# Patient Record
Sex: FEMALE | Employment: UNEMPLOYED | URBAN - METROPOLITAN AREA
[De-identification: names, ages, dates, MRNs, and addresses within clinical notes are randomized per-mention and may not be internally consistent; named-entity substitution may affect disease eponyms.]

---

## 2017-11-13 ENCOUNTER — HOSPITAL ENCOUNTER (EMERGENCY)
Facility: CLINIC | Age: 27
Discharge: HOME | End: 2017-11-13
Attending: PHYSICIAN ASSISTANT | Admitting: PHYSICIAN ASSISTANT

## 2017-11-13 VITALS
HEART RATE: 74 BPM | WEIGHT: 145 LBS | TEMPERATURE: 98.5 F | BODY MASS INDEX: 23.4 KG/M2 | RESPIRATION RATE: 14 BRPM | OXYGEN SATURATION: 100 %

## 2017-11-13 DIAGNOSIS — N30.01 ACUTE CYSTITIS WITH HEMATURIA: ICD-10-CM

## 2017-11-13 LAB
ALBUMIN UR-MCNC: 100 MG/DL
APPEARANCE UR: ABNORMAL
BACTERIA #/AREA URNS HPF: ABNORMAL /HPF
BILIRUB UR QL STRIP: NEGATIVE
COLOR UR AUTO: YELLOW
GLUCOSE UR STRIP-MCNC: NEGATIVE MG/DL
HCG UR QL: NEGATIVE
HGB UR QL STRIP: ABNORMAL
KETONES UR STRIP-MCNC: NEGATIVE MG/DL
LEUKOCYTE ESTERASE UR QL STRIP: ABNORMAL
NITRATE UR QL: POSITIVE
PH UR STRIP: 6.5 PH (ref 5–7)
RBC #/AREA URNS AUTO: 21 /HPF (ref 0–2)
SOURCE: ABNORMAL
SP GR UR STRIP: 1.02 (ref 1–1.03)
UROBILINOGEN UR STRIP-MCNC: NORMAL MG/DL (ref 0–2)
WBC #/AREA URNS AUTO: <1 /HPF (ref 0–2)

## 2017-11-13 PROCEDURE — 99213 OFFICE O/P EST LOW 20 MIN: CPT

## 2017-11-13 PROCEDURE — 87088 URINE BACTERIA CULTURE: CPT | Performed by: NURSE PRACTITIONER

## 2017-11-13 PROCEDURE — 87086 URINE CULTURE/COLONY COUNT: CPT | Performed by: NURSE PRACTITIONER

## 2017-11-13 PROCEDURE — 81025 URINE PREGNANCY TEST: CPT | Performed by: NURSE PRACTITIONER

## 2017-11-13 PROCEDURE — 87186 SC STD MICRODIL/AGAR DIL: CPT | Performed by: NURSE PRACTITIONER

## 2017-11-13 PROCEDURE — 81001 URINALYSIS AUTO W/SCOPE: CPT | Performed by: NURSE PRACTITIONER

## 2017-11-13 PROCEDURE — 99213 OFFICE O/P EST LOW 20 MIN: CPT | Performed by: PHYSICIAN ASSISTANT

## 2017-11-13 RX ORDER — NITROFURANTOIN 25; 75 MG/1; MG/1
100 CAPSULE ORAL 2 TIMES DAILY
Qty: 14 CAPSULE | Refills: 0 | Status: SHIPPED | OUTPATIENT
Start: 2017-11-13 | End: 2017-11-20

## 2017-11-13 NOTE — ED AVS SNAPSHOT
[facility] Emergency Department    [address] [address]    Phone:  [phone]    Fax:  [phone]                                       Betsey Kee   MRN: [id number]    Department:  [facility] Emergency Department   Date of Visit:  11/13/2017           After Visit Summary Signature Page     I have received my discharge instructions, and my questions have been answered. I have discussed any challenges I see with this plan with the nurse or doctor.    ..........................................................................................................................................  Patient/Patient Representative Signature      ..........................................................................................................................................  Patient Representative Print Name and Relationship to Patient    ..................................................               ................................................  Date                                            Time    ..........................................................................................................................................  Reviewed by Signature/Title    ...................................................              ..............................................  Date                                                            Time

## 2017-11-13 NOTE — ED AVS SNAPSHOT
[facility] Emergency Department    [address] [place] [address]    Phone:  [phone]    Fax:  [phone]                                       Betsey Kee   MRN: [id number]    Department:  [facility] Emergency Department   Date of Visit:  11/13/2017           Patient Information     Date Of Birth          1990        Your diagnoses for this visit were:     Acute cystitis with hematuria        You were seen by Leonela Piper PA-C.      Follow-up Information     Follow up with Bernabe Nance MD In 1 week.    Specialty:  Family Practice    Why:  As needed, If symptoms worsen    Contact information:    [address]   [place] [address]  [phone]          Discharge Instructions       Use Medication as directed    Patient advised to call for any lab results (if obtained during visit) within 2-3 days.   Drink plenty of fluids.     Prevention and treatment of UTI's discussed.  Signs and symptoms of pyelonephritis mentioned.    Patient to return to clinic sooner if not improving as expected.   Go to ER if any worsening symptoms or signs/symptoms of phylonephritis occur or flank pain occurs.           24 Hour Appointment Hotline       To make an appointment at any [facility], call [phone]). If you don't have a family doctor or clinic, we will help you find one. [facility] clinics are conveniently located to serve the needs of you and your family.             Review of your medicines      START taking        Dose / Directions Last dose taken    nitroFURantoin (macrocrystal-monohydrate) 100 MG capsule   Commonly known as:  MACROBID   Dose:  100 mg   Quantity:  14 capsule        Take 1 capsule (100 mg) by mouth 2 times daily for 7 days   Refills:  0          Our records show that you are taking the medicines listed below. If these are incorrect, please call your family doctor or clinic.        Dose / Directions Last dose taken    ibuprofen 200 MG  tablet   Commonly known as:  ADVIL/MOTRIN   Dose:  400-800 mg   Quantity:  120 tablet        Take 2-4 tablets (400-800 mg) by mouth every 6 hours as needed (cramping)   Refills:  0                Prescriptions were sent or printed at these locations (1 Prescription)                   [facility] - [place], [place] - [address] [place] [address]   [address], [place] [address]    Telephone:  [phone]   Fax:  [phone]   Hours:                  E-Prescribed (1 of 1)         nitroFURantoin, macrocrystal-monohydrate, (MACROBID) 100 MG capsule                Procedures and tests performed during your visit     HCG qualitative urine    UA with Microscopic    Urine Culture      Orders Needing Specimen Collection     None      Pending Results     Date and Time Order Name Status Description    11/13/2017 1825 Urine Culture In process             Pending Culture Results     Date and Time Order Name Status Description    11/13/2017 1825 Urine Culture In process             Pending Results Instructions     If you had any lab results that were not finalized at the time of your Discharge, you can call the ED Lab Result RN at [phone]. You will be contacted by this team for any positive Lab results or changes in treatment. The nurses are available 7 days a week from 10A to 6:30P.  You can leave a message 24 hours per day and they will return your call.        Test Results From Your Hospital Stay        11/13/2017  6:44 PM         11/13/2017  7:16 PM      Component Results     Component Value Ref Range & Units Status    Color Urine Yellow  Final    Appearance Urine Slightly Cloudy  Final    Glucose Urine Negative NEG^Negative mg/dL Final    Bilirubin Urine Negative NEG^Negative Final    Ketones Urine Negative NEG^Negative mg/dL Final    Specific Gravity Urine 1.020 1.003 - 1.035 Final    Blood Urine Moderate (A) NEG^Negative Final    pH Urine 6.5 5.0 - 7.0 pH Final    Protein  "Albumin Urine 100 (A) NEG^Negative mg/dL Final    Urobilinogen mg/dL Normal 0.0 - 2.0 mg/dL Final    Nitrite Urine Positive (A) NEG^Negative Final    Leukocyte Esterase Urine Small (A) NEG^Negative Final    Source Midstream Urine  Final    WBC Urine <1 0 - 2 /HPF Final    RBC Urine 21 (H) 0 - 2 /HPF Final    Bacteria Urine Few (A) NEG^Negative /HPF Final         2017  7:14 PM      Component Results     Component Value Ref Range & Units Status    HCG Qual Urine Negative NEG^Negative Final    This test is for screening purposes.  Results should be interpreted along with   the clinical picture.  Confirmation testing is available if warranted by   ordering KDM486, HCG Quantitative Pregnancy.                  Thank you for choosing [facility]       Thank you for choosing [facility] for your care. Our goal is always to provide you with excellent care. Hearing back from our patients is one way we can continue to improve our services. Please take a few minutes to complete the written survey that you may receive in the mail after you visit with us. Thank you!        "[employer]" Information     "[employer]" lets you send messages to your doctor, view your test results, renew your prescriptions, schedule appointments and more. To sign up, go to [email or web address]" . Click on \"Log in\" on the left side of the screen, which will take you to the Welcome page. Then click on \"Sign up Now\" on the right side of the page.     You will be asked to enter the access code listed below, as well as some personal information. Please follow the directions to create your username and password.     Your access code is: [email or web address]  Expires: 2018  7:27 PM     Your access code will  in 90 days. If you need help or a new code, please call your [facility] clinic or [phone].        Care EveryWhere ID     This is your Care EveryWhere ID. This could be used by other organizations to access your [facility] medical records  [id number]      "   Equal Access to Services     JOYCELYN TRISTAN : Quita Palencia, allison cosby, clayton workman. So [facility] [phone].    ATENCIÓN: Si habla español, tiene a head disposición servicios gratuitos de asistencia lingüística. Llame al [phone].    We comply with applicable federal civil rights laws and [place] laws. We do not discriminate on the basis of race, color, national origin, age, disability, sex, sexual orientation, or gender identity.            After Visit Summary       This is your record. Keep this with you and show to your community pharmacist(s) and doctor(s) at your next visit.

## 2017-11-14 NOTE — ED PROVIDER NOTES
History     Chief Complaint   Patient presents with     Hematuria     blood in urine tonight. Burning with urination as well this evening.      HPI    Betsey Kee is a 27 year old female who  presents today with urinary symptoms. Symptoms of dysuria and frequency have been going on for 1day(s).  Hematuria yes this evening.  sudden onsetand mild.  This patient does not have a history of urinary tract infections.   Vaginal symptoms positive discharge and slight irritation; offered wet prep and pelvic in office today patient declined. Patient states yesterday she had some left flank pain, but that has gone away.     Any history of STDs no    Patient sexually active in monogamous relationship.   Patient finished menstrual period 2 days ago.     Problem list, Medication list, Allergies, and Medical/Social/Surgical histories reviewed in [facility] and updated as appropriate.    Problem List:    Patient Active Problem List    Diagnosis Date Noted     Anemia due to blood loss, acute 09/05/2013     Priority: Medium     Carrier of group B Streptococcus 08/07/2013     Priority: Medium     Supervision of normal first pregnancy 05/31/2013     Priority: Medium     Transfer  Normal anatomy US-see records.       24 hr info given 04/04/2012     Priority: Medium     EMERGENCY CARE PLAN  Presenting Problem Signs and Symptoms Treatment Plan    Questions or conerns during clinic hours    I will call the clinic directly     Questions or conerns outside clinic hours    I will call the 24 hour nurse line at [phone]    Patient needs to schedule an appointment    I will call the 24 hour scheduling team at [phone] or clinic directly    Same day treatment     I will call the clinic first, nurse line if after hours, urgent care and express care if needed                                   CARDIOVASCULAR SCREENING; LDL GOAL LESS THAN 160 08/09/2011     Priority: Medium        Past Medical History:    Past Medical History:   Diagnosis  "Date     Chickenpox        Past Surgical History:    Past Surgical History:   Procedure Laterality Date      SECTION  2013    Procedure:  SECTION;   Section;  Surgeon: Maty Soto MD;  Location: [facility] OR     NO HISTORY OF SURGERY         Family History:    Family History   Problem Relation Age of Onset     Psychotic Disorder Father      depression     Alcohol/Drug Maternal Uncle      Several uncles     DIABETES Maternal Grandfather      Thyroid Disease Mother      Hypertension Mother      DIABETES Mother      \"borderline\"     Hypertension Maternal Grandmother      Arthritis Maternal Grandmother      Hypertension Maternal Grandfather      Depression Maternal Grandfather      Family History Negative Paternal Grandmother      Connective Tissue Disorder Paternal Grandfather      skin disease-unknown what it is     Alcohol/Drug Father      alcohol     Alcohol/Drug Maternal Grandfather      alcohol     Alcohol/Drug Paternal Grandfather      alcohol     Blood Disease Maternal Grandmother      anemia     CANCER Paternal Grandfather      CANCER Maternal Grandfather      pancreatic     Cardiovascular Maternal Grandfather      MI       Social History:  Marital Status:  Single [1]  Social History   Substance Use Topics     Smoking status: Never Smoker     Smokeless tobacco: Never Used      Comment: no exposure     Alcohol use No        Medications:      nitroFURantoin, macrocrystal-monohydrate, (MACROBID) 100 MG capsule   ibuprofen (ADVIL,MOTRIN) 200 MG tablet         Review of Systems    Physical Exam   Pulse: 74  Temp: 98.5  F (36.9  C)  Resp: 14  Weight: 65.8 kg (145 lb)  SpO2: 100 %      Physical Exam     Pulse 74  Temp 98.5  F (36.9  C) (Oral)  Resp 14  Wt 65.8 kg (145 lb)  LMP 2017 (Exact Date)  SpO2 100%  BMI 23.4 kg/m2    GENERAL APPEARANCE: healthy, alert and no distress  RESP: lungs clear to auscultation - no rales, rhonchi or wheezes  CV: regular rates and rhythm, normal S1 " S2, no murmur noted  ABDOMEN:  soft, nontender, no HSM or masses and bowel sounds normal  BACK: No CVA tenderness  SKIN: no suspicious lesions or rashes    Results for orders placed or performed during the hospital encounter of 11/13/17 (from the past 24 hour(s))   HCG qualitative urine   Result Value Ref Range    HCG Qual Urine Negative NEG^Negative         ED Course     ED Course     Procedures              Critical Care time:  none               Labs Ordered and Resulted from Time of ED Arrival Up to the Time of Departure from the ED   ROUTINE UA WITH MICROSCOPIC - Abnormal; Notable for the following:        Result Value    Blood Urine Moderate (*)     Protein Albumin Urine 100 (*)     Nitrite Urine Positive (*)     Leukocyte Esterase Urine Small (*)     RBC Urine 21 (*)     Bacteria Urine Few (*)     All other components within normal limits   HCG QUALITATIVE URINE   URINE CULTURE AEROBIC BACTERIAL       Assessments & Plan (with Medical Decision Making)     I have reviewed the nursing notes.    I have reviewed the findings, diagnosis, plan and need for follow up with the patient.       New Prescriptions    NITROFURANTOIN, MACROCRYSTAL-MONOHYDRATE, (MACROBID) 100 MG CAPSULE    Take 1 capsule (100 mg) by mouth 2 times daily for 7 days       Final diagnoses:   Acute cystitis with hematuria       11/13/2017   [facility] EMERGENCY DEPARTMENT     Leonela Piper PA-C  11/13/17 1929

## 2017-11-14 NOTE — DISCHARGE INSTRUCTIONS
Use Medication as directed    Patient advised to call for any lab results (if obtained during visit) within 2-3 days.   Drink plenty of fluids.     Prevention and treatment of UTI's discussed.  Signs and symptoms of pyelonephritis mentioned.    Patient to return to clinic sooner if not improving as expected.   Go to ER if any worsening symptoms or signs/symptoms of phylonephritis occur or flank pain occurs.

## 2017-11-15 ENCOUNTER — TELEPHONE (OUTPATIENT)
Dept: EMERGENCY MEDICINE | Facility: CLINIC | Age: 27
End: 2017-11-15

## 2017-11-15 LAB
BACTERIA SPEC CULT: ABNORMAL
Lab: ABNORMAL
SPECIMEN SOURCE: ABNORMAL

## 2017-11-15 NOTE — TELEPHONE ENCOUNTER
[facility] Emergency Department Lab result notification:    Reason for call  Notify of lab results, assess symptoms,  review ED providers recommendations (if necessary) and advise per ED lab result f/u protocol.    Lab result  Final Urine Culture Report on 11/15/17  [facility] ED discharge antibiotic: Nitrofurantoin Macrocrystal-Monohydrate (Macrobid) 100 mg PO capsule, Take 1 capsule (100 mg) by mouth 2 times daily for 7 days  #1. Bacteria, >100,000 colonies/mL Escherichia coli, is SUSCEPTIBLE to ED discharge antibiotic.    As per [facility] ED Lab Result protocol, no change in antibiotic therapy.  Left voicemail message requesting a call back to [phone] between 10 a.m. and 6:30 p.m. for patient's ED/ lab results.    Lisa Duran RN    [facility] RN  Lung Nodule and ED Lab Results F/U RN  Epic pool (ED late result f/u RN) : [id number]   # [phone]

## 2019-11-06 ENCOUNTER — HOSPITAL ENCOUNTER (EMERGENCY)
Facility: CLINIC | Age: 29
Discharge: HOME | End: 2019-11-06
Attending: PHYSICIAN ASSISTANT | Admitting: PHYSICIAN ASSISTANT
Payer: COMMERCIAL

## 2019-11-06 VITALS
HEART RATE: 60 BPM | OXYGEN SATURATION: 100 % | SYSTOLIC BLOOD PRESSURE: 131 MMHG | DIASTOLIC BLOOD PRESSURE: 89 MMHG | BODY MASS INDEX: 26.52 KG/M2 | RESPIRATION RATE: 16 BRPM | HEIGHT: 66 IN | WEIGHT: 165 LBS | TEMPERATURE: 98.1 F

## 2019-11-06 DIAGNOSIS — B02.9 SHINGLES: ICD-10-CM

## 2019-11-06 PROCEDURE — G0463 HOSPITAL OUTPT CLINIC VISIT: HCPCS

## 2019-11-06 PROCEDURE — 99213 OFFICE O/P EST LOW 20 MIN: CPT | Mod: Z6 | Performed by: PHYSICIAN ASSISTANT

## 2019-11-06 RX ORDER — VALACYCLOVIR HYDROCHLORIDE 1 G/1
1000 TABLET, FILM COATED ORAL 3 TIMES DAILY
Qty: 21 TABLET | Refills: 0 | Status: SHIPPED | OUTPATIENT
Start: 2019-11-06 | End: 2023-11-15

## 2019-11-06 ASSESSMENT — ENCOUNTER SYMPTOMS
GASTROINTESTINAL NEGATIVE: 1
CARDIOVASCULAR NEGATIVE: 1
CONSTITUTIONAL NEGATIVE: 1

## 2019-11-06 ASSESSMENT — MIFFLIN-ST. JEOR: SCORE: 1495.19

## 2019-11-06 NOTE — ED AVS SNAPSHOT
[facility] Emergency Department  [address] [address]  Phone:  [phone]  Fax:  [phone]                                    Betsey Kee   MRN: [id number]    Department:  [facility] Emergency Department   Date of Visit:  11/6/2019           After Visit Summary Signature Page    I have received my discharge instructions, and my questions have been answered. I have discussed any challenges I see with this plan with the nurse or doctor.    ..........................................................................................................................................  Patient/Patient Representative Signature      ..........................................................................................................................................  Patient Representative Print Name and Relationship to Patient    ..................................................               ................................................  Date                                   Time    ..........................................................................................................................................  Reviewed by Signature/Title    ...................................................              ..............................................  Date                                               Time          22EPIC Rev 08/18

## 2019-11-07 NOTE — ED PROVIDER NOTES
"  History     Chief Complaint   Patient presents with     Rash     painful rash started 3 days ago on her back     HPI  Betsey Kee is a 29 year old female who presents to  for evaluation of rash. About 3 days ago she noticed some bumps on her back, left side. She has noticed a new patch of bumps near her right shoulder blade and a few bumps on her right forearm. She tells me they were initially irritating, she now describes a deep, sharper pain. She feels like a \"rug burn\". She denies any drainage or bleeding. She denies any fevers, sweats, chills. She has been run down with a cold, and also has a new 2 month old at home. She had chicken pox as a child. Her 2 older children have both been immunized.       Allergies:  Allergies   Allergen Reactions     Nkda [No Known Drug Allergies]        Problem List:    Patient Active Problem List    Diagnosis Date Noted     Anemia due to blood loss, acute 09/05/2013     Priority: Medium     Carrier of group B Streptococcus 08/07/2013     Priority: Medium     Supervision of normal first pregnancy 05/31/2013     Priority: Medium     Transfer  Normal anatomy US-see records.       24 hr info given 04/04/2012     Priority: Medium     EMERGENCY CARE PLAN  Presenting Problem Signs and Symptoms Treatment Plan    Questions or conerns during clinic hours    I will call the clinic directly     Questions or conerns outside clinic hours    I will call the 24 hour nurse line at [phone]    Patient needs to schedule an appointment    I will call the 24 hour scheduling team at [phone] or clinic directly    Same day treatment     I will call the clinic first, nurse line if after hours, urgent care and express care if needed                                   CARDIOVASCULAR SCREENING; LDL GOAL LESS THAN 160 08/09/2011     Priority: Medium        Past Medical History:    Past Medical History:   Diagnosis Date     Chickenpox        Past Surgical History:    Past Surgical History: " "  Procedure Laterality Date      SECTION  2013    Procedure:  SECTION;   Section;  Surgeon: Maty Soto MD;  Location: [facility] OR     NO HISTORY OF SURGERY         Family History:    Family History   Problem Relation Age of Onset     Psychotic Disorder Father         depression     Alcohol/Drug Maternal Uncle         Several uncles     Diabetes Maternal Grandfather      Thyroid Disease Mother      Hypertension Mother      Diabetes Mother         \"borderline\"     Hypertension Maternal Grandmother      Arthritis Maternal Grandmother      Hypertension Maternal Grandfather      Depression Maternal Grandfather      Family History Negative Paternal Grandmother      Connective Tissue Disorder Paternal Grandfather         skin disease-unknown what it is     Alcohol/Drug Father         alcohol     Alcohol/Drug Maternal Grandfather         alcohol     Alcohol/Drug Paternal Grandfather         alcohol     Blood Disease Maternal Grandmother         anemia     Cancer Paternal Grandfather      Cancer Maternal Grandfather         pancreatic     Cardiovascular Maternal Grandfather         MI       Social History:  Marital Status:  Single [1]  Social History     Tobacco Use     Smoking status: Never Smoker     Smokeless tobacco: Never Used     Tobacco comment: no exposure   Substance Use Topics     Alcohol use: No     Drug use: No        Medications:    ibuprofen (ADVIL,MOTRIN) 200 MG tablet          Review of Systems   Constitutional: Negative.    Cardiovascular: Negative.    Gastrointestinal: Negative.    Skin: Positive for rash.       Physical Exam   BP: 131/89  Pulse: 60  Temp: 98.1  F (36.7  C)  Resp: 16  Height: 167.6 cm (5' 6\")  Weight: 74.8 kg (165 lb)(stated)  SpO2: 100 %      Physical Exam  Constitutional:       Appearance: Normal appearance. She is normal weight.   Skin:         Neurological:      Mental Status: She is alert.         ED Course        Procedures               Critical Care " time:  none               No results found for this or any previous visit (from the past 24 hour(s)).    Medications - No data to display    Assessments & Plan (with Medical Decision Making)   Rash is consistent with shingles. We discussed course of illness at length. Given the fact she has a 2 month old at home, who could potentially develop varicella as she has not been immunized, she should avoid contact to the rash. Pt and her mother express understanding. She will cover the lesions with gauze for pain relief as well. Valtrex as prescribed. She can take ibuprofen and APAP for relief. Follow-up with PCP if symptoms are not improving or with worsening pain.     I have reviewed the nursing notes.    I have reviewed the findings, diagnosis, plan and need for follow up with the patient.       New Prescriptions    No medications on file       Final diagnoses:   None       11/6/2019   [facility] EMERGENCY DEPARTMENT     Maty Castañeda PA-C  11/06/19 0126

## 2019-11-07 NOTE — DISCHARGE INSTRUCTIONS
You have shingles  Start valtrex now  Wash clothing separately from baby  Do not expose baby to the rash  Ibuprofen and tylenol for pain  Rash should resolve in 10-12 days

## 2021-11-27 ENCOUNTER — HOSPITAL ENCOUNTER (EMERGENCY)
Facility: CLINIC | Age: 31
Discharge: HOME | End: 2021-11-27
Attending: PHYSICIAN ASSISTANT | Admitting: PHYSICIAN ASSISTANT
Payer: COMMERCIAL

## 2021-11-27 VITALS
HEIGHT: 66 IN | DIASTOLIC BLOOD PRESSURE: 73 MMHG | BODY MASS INDEX: 26.52 KG/M2 | OXYGEN SATURATION: 100 % | TEMPERATURE: 99.3 F | HEART RATE: 88 BPM | SYSTOLIC BLOOD PRESSURE: 129 MMHG | WEIGHT: 165 LBS

## 2021-11-27 DIAGNOSIS — Z20.822 EXPOSURE TO 2019 NOVEL CORONAVIRUS: ICD-10-CM

## 2021-11-27 DIAGNOSIS — Z20.822 SUSPECTED COVID-19 VIRUS INFECTION: ICD-10-CM

## 2021-11-27 LAB
FLUAV RNA SPEC QL NAA+PROBE: NEGATIVE
FLUBV RNA RESP QL NAA+PROBE: NEGATIVE
SARS-COV-2 RNA RESP QL NAA+PROBE: POSITIVE

## 2021-11-27 PROCEDURE — 99282 EMERGENCY DEPT VISIT SF MDM: CPT | Performed by: PHYSICIAN ASSISTANT

## 2021-11-27 PROCEDURE — 87636 SARSCOV2 & INF A&B AMP PRB: CPT | Performed by: PHYSICIAN ASSISTANT

## 2021-11-27 PROCEDURE — 99283 EMERGENCY DEPT VISIT LOW MDM: CPT

## 2021-11-27 PROCEDURE — C9803 HOPD COVID-19 SPEC COLLECT: HCPCS

## 2021-11-27 ASSESSMENT — ENCOUNTER SYMPTOMS
FATIGUE: 1
CARDIOVASCULAR NEGATIVE: 1
FEVER: 1
COUGH: 1
SHORTNESS OF BREATH: 1
ARTHRALGIAS: 1

## 2021-11-27 ASSESSMENT — MIFFLIN-ST. JEOR: SCORE: 1485.19

## 2021-11-28 NOTE — ED PROVIDER NOTES
History     Chief Complaint   Patient presents with     Cough     HPI  Betsey Kee is a 31 year old female who presents for evaluation of fevers up to 102*F, cough, fatigue, and body aches for the past 2-3 days.  Pt's son is ill with similar symptoms.  They just found out that pt's mother just tested positive for COVID-19 and they have been around her with close contact.  Pt also complains of mild associated shortness of breath with exertion.  Denies nausea, vomiting, diarrhea, abdominal pain, or chest pain.  Pt is not vaccinated against COVID-19.  She has no history of asthma.      Allergies:  Allergies   Allergen Reactions     Nkda [No Known Drug Allergies]        Problem List:    Patient Active Problem List    Diagnosis Date Noted     Anemia due to blood loss, acute 2013     Priority: Medium     Carrier of group B Streptococcus 2013     Priority: Medium     Supervision of normal first pregnancy 2013     Priority: Medium     Transfer  Normal anatomy US-see records.       24 hr info given 2012     Priority: Medium     EMERGENCY CARE PLAN  Presenting Problem Signs and Symptoms Treatment Plan    Questions or conerns during clinic hours    I will call the clinic directly     Questions or conerns outside clinic hours    I will call the 24 hour nurse line at [phone]    Patient needs to schedule an appointment    I will call the 24 hour scheduling team at [phone] or clinic directly    Same day treatment     I will call the clinic first, nurse line if after hours, urgent care and express care if needed                                   CARDIOVASCULAR SCREENING; LDL GOAL LESS THAN 160 2011     Priority: Medium        Past Medical History:    Past Medical History:   Diagnosis Date     Chickenpox        Past Surgical History:    Past Surgical History:   Procedure Laterality Date      SECTION  2013    Procedure:  SECTION;   Section;  Surgeon: Charles  "Maty Diaz MD;  Location: [facility] OR     NO HISTORY OF SURGERY         Family History:    Family History   Problem Relation Age of Onset     Psychotic Disorder Father         depression     Alcohol/Drug Maternal Uncle         Several uncles     Diabetes Maternal Grandfather      Thyroid Disease Mother      Hypertension Mother      Diabetes Mother         \"borderline\"     Hypertension Maternal Grandmother      Arthritis Maternal Grandmother      Hypertension Maternal Grandfather      Depression Maternal Grandfather      Family History Negative Paternal Grandmother      Connective Tissue Disorder Paternal Grandfather         skin disease-unknown what it is     Alcohol/Drug Father         alcohol     Alcohol/Drug Maternal Grandfather         alcohol     Alcohol/Drug Paternal Grandfather         alcohol     Blood Disease Maternal Grandmother         anemia     Cancer Paternal Grandfather      Cancer Maternal Grandfather         pancreatic     Cardiovascular Maternal Grandfather         MI       Social History:  Marital Status:  Single [1]  Social History     Tobacco Use     Smoking status: Never Smoker     Smokeless tobacco: Never Used     Tobacco comment: no exposure   Substance Use Topics     Alcohol use: No     Drug use: No        Medications:    ibuprofen (ADVIL,MOTRIN) 200 MG tablet  valACYclovir (VALTREX) 1000 mg tablet          Review of Systems   Constitutional: Positive for fatigue and fever.   Respiratory: Positive for cough and shortness of breath.    Cardiovascular: Negative.    Musculoskeletal: Positive for arthralgias.   Skin: Negative.    All other systems reviewed and are negative.      Physical Exam   BP: 129/73  Pulse: 88  Temp: 99.3  F (37.4  C)  Height: 167.6 cm (5' 6\")  Weight: 74.8 kg (165 lb)  SpO2: 100 %      Physical Exam  Constitutional:       General: She is not in acute distress.     Appearance: Normal appearance. She is well-developed. She is not ill-appearing, toxic-appearing or diaphoretic. "   HENT:      Head: Normocephalic and atraumatic.      Right Ear: Tympanic membrane, ear canal and external ear normal.      Left Ear: Tympanic membrane, ear canal and external ear normal.      Nose: Nose normal. No congestion or rhinorrhea.      Mouth/Throat:      Lips: Pink.      Mouth: Mucous membranes are moist.      Pharynx: Oropharynx is clear. Uvula midline. No pharyngeal swelling, oropharyngeal exudate, posterior oropharyngeal erythema or uvula swelling.      Tonsils: No tonsillar exudate or tonsillar abscesses.   Eyes:      Extraocular Movements: Extraocular movements intact.      Conjunctiva/sclera: Conjunctivae normal.      Pupils: Pupils are equal, round, and reactive to light.   Cardiovascular:      Rate and Rhythm: Normal rate and regular rhythm.      Heart sounds: Normal heart sounds.   Pulmonary:      Effort: Pulmonary effort is normal. No respiratory distress.      Breath sounds: Normal breath sounds. No stridor. No wheezing, rhonchi or rales.   Musculoskeletal:         General: Normal range of motion.      Cervical back: Full passive range of motion without pain, normal range of motion and neck supple. No rigidity. Normal range of motion.   Lymphadenopathy:      Cervical: No cervical adenopathy.   Skin:     General: Skin is warm and dry.   Neurological:      Mental Status: She is alert and oriented to person, place, and time.   Psychiatric:         Behavior: Behavior is cooperative.         ED Course        Procedures    No results found for this or any previous visit (from the past 24 hour(s)).    Medications - No data to display    Assessments & Plan (with Medical Decision Making)     Pt is a 31 year old female who presents for evaluation of fevers up to 102*F, cough, fatigue, and body aches for the past 2-3 days.  Pt's son is ill with similar symptoms.  They just found out that pt's mother just tested positive for COVID-19 and they have been around her with close contact.  Pt also complains of  mild associated shortness of breath with exertion.  Pt is not vaccinated against COVID-19.      Pt is afebrile on arrival.  Exam as above.  COVID-19 testing was obtained and is pending.  Pt is not hypoxic.  She is not in any respiratory distress.  Encouraged symptomatic treatments at home.  Return precautions were reviewed.  Hand-outs were provided.    Patient was instructed to follow-up with PCP in 3-5 days for continued care and management or sooner if new or worsening symptoms.  She is to return to the ED for persistent and/or worsening symptoms.  Patient expressed understanding of the diagnosis and plan and was discharged home in good condition.    I have reviewed the nursing notes.    I have reviewed the findings, diagnosis, plan and need for follow up with the patient.    New Prescriptions    No medications on file       Final diagnoses:   Suspected COVID-19 virus infection   Exposure to 2019 novel coronavirus       11/27/2021   [facility] EMERGENCY DEPT      Disclaimer:  This note consists of symbols derived from keyboarding, dictation and/or voice recognition software.  As a result, there may be errors in the script that have gone undetected.  Please consider this when interpreting information found in this chart.     Courtney Loaiza PA-C  11/27/21 2111

## 2021-12-26 ENCOUNTER — HEALTH MAINTENANCE LETTER (OUTPATIENT)
Age: 31
End: 2021-12-26

## 2022-10-23 ENCOUNTER — HEALTH MAINTENANCE LETTER (OUTPATIENT)
Age: 32
End: 2022-10-23

## 2023-04-02 ENCOUNTER — HEALTH MAINTENANCE LETTER (OUTPATIENT)
Age: 33
End: 2023-04-02

## 2023-10-29 ENCOUNTER — HOSPITAL ENCOUNTER (EMERGENCY)
Facility: CLINIC | Age: 33
Discharge: HOME | End: 2023-10-29
Attending: EMERGENCY MEDICINE | Admitting: EMERGENCY MEDICINE
Payer: COMMERCIAL

## 2023-10-29 VITALS
OXYGEN SATURATION: 100 % | HEIGHT: 66 IN | TEMPERATURE: 98.3 F | RESPIRATION RATE: 15 BRPM | DIASTOLIC BLOOD PRESSURE: 65 MMHG | HEART RATE: 87 BPM | WEIGHT: 160 LBS | BODY MASS INDEX: 25.71 KG/M2 | SYSTOLIC BLOOD PRESSURE: 114 MMHG

## 2023-10-29 DIAGNOSIS — R79.89 ELEVATED TROPONIN: ICD-10-CM

## 2023-10-29 DIAGNOSIS — R00.2 PALPITATIONS: ICD-10-CM

## 2023-10-29 DIAGNOSIS — D50.9 IRON DEFICIENCY ANEMIA, UNSPECIFIED IRON DEFICIENCY ANEMIA TYPE: ICD-10-CM

## 2023-10-29 LAB
ALBUMIN UR-MCNC: NEGATIVE MG/DL
ANION GAP SERPL CALCULATED.3IONS-SCNC: 17 MMOL/L (ref 7–15)
APPEARANCE UR: CLEAR
BASOPHILS # BLD AUTO: 0.1 10E3/UL (ref 0–0.2)
BASOPHILS NFR BLD AUTO: 1 %
BILIRUB UR QL STRIP: NEGATIVE
BUN SERPL-MCNC: 10.1 MG/DL (ref 6–20)
CALCIUM SERPL-MCNC: 9.7 MG/DL (ref 8.6–10)
CHLORIDE SERPL-SCNC: 106 MMOL/L (ref 98–107)
COLOR UR AUTO: COLORLESS
CREAT SERPL-MCNC: 0.79 MG/DL (ref 0.51–0.95)
DEPRECATED HCO3 PLAS-SCNC: 18 MMOL/L (ref 22–29)
EGFRCR SERPLBLD CKD-EPI 2021: >90 ML/MIN/1.73M2
EOSINOPHIL # BLD AUTO: 0.1 10E3/UL (ref 0–0.7)
EOSINOPHIL NFR BLD AUTO: 1 %
ERYTHROCYTE [DISTWIDTH] IN BLOOD BY AUTOMATED COUNT: 20.2 % (ref 10–15)
GLUCOSE SERPL-MCNC: 86 MG/DL (ref 70–99)
GLUCOSE UR STRIP-MCNC: NEGATIVE MG/DL
HCG UR QL: NEGATIVE
HCT VFR BLD AUTO: 34.4 % (ref 35–47)
HGB BLD-MCNC: 9.7 G/DL (ref 11.7–15.7)
HGB UR QL STRIP: ABNORMAL
IMM GRANULOCYTES # BLD: 0 10E3/UL
IMM GRANULOCYTES NFR BLD: 0 %
IRON BINDING CAPACITY (ROCHE): 456 UG/DL (ref 240–430)
IRON SATN MFR SERPL: 4 % (ref 15–46)
IRON SERPL-MCNC: 16 UG/DL (ref 37–145)
KETONES UR STRIP-MCNC: 5 MG/DL
LEUKOCYTE ESTERASE UR QL STRIP: NEGATIVE
LYMPHOCYTES # BLD AUTO: 0.9 10E3/UL (ref 0.8–5.3)
LYMPHOCYTES NFR BLD AUTO: 12 %
MCH RBC QN AUTO: 18.5 PG (ref 26.5–33)
MCHC RBC AUTO-ENTMCNC: 28.2 G/DL (ref 31.5–36.5)
MCV RBC AUTO: 66 FL (ref 78–100)
MONOCYTES # BLD AUTO: 0.3 10E3/UL (ref 0–1.3)
MONOCYTES NFR BLD AUTO: 4 %
MUCOUS THREADS #/AREA URNS LPF: PRESENT /LPF
NEUTROPHILS # BLD AUTO: 6.2 10E3/UL (ref 1.6–8.3)
NEUTROPHILS NFR BLD AUTO: 82 %
NITRATE UR QL: NEGATIVE
NRBC # BLD AUTO: 0 10E3/UL
NRBC BLD AUTO-RTO: 0 /100
PH UR STRIP: 6 [PH] (ref 5–7)
PLATELET # BLD AUTO: 262 10E3/UL (ref 150–450)
POTASSIUM SERPL-SCNC: 3.8 MMOL/L (ref 3.4–5.3)
RBC # BLD AUTO: 5.23 10E6/UL (ref 3.8–5.2)
RBC URINE: 1 /HPF
SODIUM SERPL-SCNC: 141 MMOL/L (ref 135–145)
SP GR UR STRIP: 1 (ref 1–1.03)
SQUAMOUS EPITHELIAL: <1 /HPF
TROPONIN T SERPL HS-MCNC: 42 NG/L
TROPONIN T SERPL HS-MCNC: 46 NG/L
TROPONIN T SERPL HS-MCNC: 58 NG/L
TSH SERPL DL<=0.005 MIU/L-ACNC: 1.25 UIU/ML (ref 0.3–4.2)
UROBILINOGEN UR STRIP-MCNC: NORMAL MG/DL
WBC # BLD AUTO: 7.5 10E3/UL (ref 4–11)
WBC URINE: 2 /HPF

## 2023-10-29 PROCEDURE — 80048 BASIC METABOLIC PNL TOTAL CA: CPT | Performed by: EMERGENCY MEDICINE

## 2023-10-29 PROCEDURE — 83550 IRON BINDING TEST: CPT | Performed by: EMERGENCY MEDICINE

## 2023-10-29 PROCEDURE — 81001 URINALYSIS AUTO W/SCOPE: CPT | Performed by: EMERGENCY MEDICINE

## 2023-10-29 PROCEDURE — 99284 EMERGENCY DEPT VISIT MOD MDM: CPT

## 2023-10-29 PROCEDURE — 85025 COMPLETE CBC W/AUTO DIFF WBC: CPT | Performed by: EMERGENCY MEDICINE

## 2023-10-29 PROCEDURE — 81025 URINE PREGNANCY TEST: CPT | Performed by: EMERGENCY MEDICINE

## 2023-10-29 PROCEDURE — 84443 ASSAY THYROID STIM HORMONE: CPT | Performed by: EMERGENCY MEDICINE

## 2023-10-29 PROCEDURE — 36415 COLL VENOUS BLD VENIPUNCTURE: CPT | Performed by: EMERGENCY MEDICINE

## 2023-10-29 PROCEDURE — 93005 ELECTROCARDIOGRAM TRACING: CPT

## 2023-10-29 PROCEDURE — 84484 ASSAY OF TROPONIN QUANT: CPT | Performed by: EMERGENCY MEDICINE

## 2023-10-29 RX ORDER — FERROUS GLUCONATE 324(38)MG
324 TABLET ORAL
Qty: 30 TABLET | Refills: 1 | Status: SHIPPED | OUTPATIENT
Start: 2023-10-29 | End: 2023-11-15

## 2023-10-29 ASSESSMENT — ENCOUNTER SYMPTOMS
HEMATOLOGIC/LYMPHATIC NEGATIVE: 1
RESPIRATORY NEGATIVE: 1
MUSCULOSKELETAL NEGATIVE: 1
PSYCHIATRIC NEGATIVE: 1
NEUROLOGICAL NEGATIVE: 1
CONSTITUTIONAL NEGATIVE: 1
EYES NEGATIVE: 1
PALPITATIONS: 1
ALLERGIC/IMMUNOLOGIC NEGATIVE: 1
ENDOCRINE NEGATIVE: 1
GASTROINTESTINAL NEGATIVE: 1

## 2023-10-29 ASSESSMENT — ACTIVITIES OF DAILY LIVING (ADL)
ADLS_ACUITY_SCORE: 35

## 2023-10-29 NOTE — ED PROVIDER NOTES
History     Chief Complaint   Patient presents with    Palpitations     HPI  Betsey Kee is a 33 year old female who presents for evaluation with report of palpitations that lasted about 45 minutes.  By arrival.  She felt that her heart was racing.  She does have a history of anxiety but reports her symptoms feel different.  She notes on intake that she had nausea and got lightheaded.    On examination patient arrived by car with her  and 2 children. Patient  reported that earlier this afternoon she felt her pulse was rapid.  Her iWatch captured a pulse of 210.  There was no rhythm captured.  She reports that she been doing well.  She initially felt maybe it was anxiety or panic but felt that her pulse was more rapid than when she had panic anxiety attacks.  She played basketball  in high school and did not have any episodes of fainting or chest pain with exertion activity.  Patient reports she works as a  for a program in [place].  She takes no active medications and has no medication allergies.  Her last menstrual period was about 3 weeks prior.  Patient reported her symptoms began after grocery shopping this afternoon around 1 PM.  She felt her pulse was rapid and lasted for about 30 to 45 minutes.  She does admit that she has a history of heavy vaginal bleeding.  Last hemoglobin was 7 about 2 months ago.  She is required iron transfusion in the past after delivery of her second daughter.    Allergies:  Allergies   Allergen Reactions    Nkda [No Known Drug Allergy]        Problem List:    Patient Active Problem List    Diagnosis Date Noted    Anemia due to blood loss, acute 09/05/2013     Priority: Medium    Carrier of group B Streptococcus 08/07/2013     Priority: Medium    Supervision of normal first pregnancy 05/31/2013     Priority: Medium     Transfer  Normal anatomy US-see records.      24 hr info given 04/04/2012     Priority: Medium     EMERGENCY CARE  "PLAN  Presenting Problem Signs and Symptoms Treatment Plan    Questions or conerns during clinic hours    I will call the clinic directly     Questions or conerns outside clinic hours    I will call the 24 hour nurse line at [phone]    Patient needs to schedule an appointment    I will call the 24 hour scheduling team at [phone] or clinic directly    Same day treatment     I will call the clinic first, nurse line if after hours, urgent care and express care if needed                                      CARDIOVASCULAR SCREENING; LDL GOAL LESS THAN 160 2011     Priority: Medium        Past Medical History:    Past Medical History:   Diagnosis Date    Chickenpox        Past Surgical History:    Past Surgical History:   Procedure Laterality Date     SECTION  2013    Procedure:  SECTION;   Section;  Surgeon: Maty Soto MD;  Location: [facility] OR    NO HISTORY OF SURGERY         Family History:    Family History   Problem Relation Age of Onset    Psychotic Disorder Father         depression    Alcohol/Drug Maternal Uncle         Several uncles    Diabetes Maternal Grandfather     Thyroid Disease Mother     Hypertension Mother     Diabetes Mother         \"borderline\"    Hypertension Maternal Grandmother     Arthritis Maternal Grandmother     Hypertension Maternal Grandfather     Depression Maternal Grandfather     Family History Negative Paternal Grandmother     Connective Tissue Disorder Paternal Grandfather         skin disease-unknown what it is    Alcohol/Drug Father         alcohol    Alcohol/Drug Maternal Grandfather         alcohol    Alcohol/Drug Paternal Grandfather         alcohol    Blood Disease Maternal Grandmother         anemia    Cancer Paternal Grandfather     Cancer Maternal Grandfather         pancreatic    Cardiovascular Maternal Grandfather         MI       Social History:  Marital Status:  Single [1]  Social History     Tobacco Use    Smoking status: " "Never    Smokeless tobacco: Never    Tobacco comments:     no exposure   Substance Use Topics    Alcohol use: No    Drug use: No        Medications:    ferrous gluconate (FERGON) 324 (38 Fe) MG tablet  ibuprofen (ADVIL,MOTRIN) 200 MG tablet  valACYclovir (VALTREX) 1000 mg tablet          Review of Systems   Constitutional: Negative.    HENT: Negative.     Eyes: Negative.    Respiratory: Negative.     Cardiovascular:  Positive for palpitations.   Gastrointestinal: Negative.    Endocrine: Negative.    Genitourinary: Negative.    Musculoskeletal: Negative.    Skin: Negative.    Allergic/Immunologic: Negative.    Neurological: Negative.    Hematological: Negative.    Psychiatric/Behavioral: Negative.     All other systems reviewed and are negative.      Physical Exam   BP: 136/76  Pulse: 102  Temp: 98.3  F (36.8  C)  Resp: 16  Height: 167.6 cm (5' 6\")  Weight: 72.6 kg (160 lb)  SpO2: 100 %      Physical Exam  Constitutional:       General: She is not in acute distress.     Appearance: Normal appearance. She is not ill-appearing, toxic-appearing or diaphoretic.   HENT:      Head: Normocephalic and atraumatic.      Nose: Nose normal.      Mouth/Throat:      Mouth: Mucous membranes are moist.   Eyes:      Extraocular Movements: Extraocular movements intact.      Pupils: Pupils are equal, round, and reactive to light.   Cardiovascular:      Rate and Rhythm: Normal rate and regular rhythm.   Pulmonary:      Effort: Pulmonary effort is normal. No respiratory distress.      Breath sounds: Normal breath sounds. No stridor. No wheezing, rhonchi or rales.   Chest:      Chest wall: No tenderness.   Musculoskeletal:         General: No swelling, tenderness, deformity or signs of injury.      Cervical back: Normal range of motion and neck supple.      Right lower leg: No edema.      Left lower leg: No edema.   Skin:     Capillary Refill: Capillary refill takes less than 2 seconds.      Coloration: Skin is not jaundiced or pale.      " Findings: No bruising, erythema, lesion or rash.   Neurological:      General: No focal deficit present.      Mental Status: She is alert and oriented to person, place, and time.      Cranial Nerves: No cranial nerve deficit.      Sensory: No sensory deficit.      Motor: No weakness.      Coordination: Coordination normal.      Gait: Gait normal.      Deep Tendon Reflexes: Reflexes normal.   Psychiatric:         Mood and Affect: Mood normal.         Behavior: Behavior normal.         Thought Content: Thought content normal.         Judgment: Judgment normal.         ED Course               Procedures              EKG Interpretation:      Interpreted by Jorge Schafer MD  Time reviewed: 1545  Symptoms at time of EKG: None   Rhythm: sinus tachycardia  Rate: Tachycardia  Axis: Normal  Ectopy: none  Conduction: normal  ST Segments/ T Waves: Non-specific ST-T wave changes  Q Waves: nonspecific  Comparison to prior: no old viewable EKG for comparison    Clinical Impression: Sinus tachycardia      Critical Care time:  none           ED medications; none    ED Vitals:  Vitals:    10/29/23 1714 10/29/23 1729 10/29/23 1744 10/29/23 1759   BP: 121/79 123/88 123/71 120/81   Pulse: 100 100 102 98   Resp: 27 11 (!) 6 15   Temp:       TempSrc:       SpO2: 100% 98% 99% 100%   Weight:       Height:         ED labs and imaging:  Results for orders placed or performed during the hospital encounter of 10/29/23   Basic metabolic panel     Status: Abnormal   Result Value Ref Range    Sodium 141 135 - 145 mmol/L    Potassium 3.8 3.4 - 5.3 mmol/L    Chloride 106 98 - 107 mmol/L    Carbon Dioxide (CO2) 18 (L) 22 - 29 mmol/L    Anion Gap 17 (H) 7 - 15 mmol/L    Urea Nitrogen 10.1 6.0 - 20.0 mg/dL    Creatinine 0.79 0.51 - 0.95 mg/dL    GFR Estimate >90 >60 mL/min/1.73m2    Calcium 9.7 8.6 - 10.0 mg/dL    Glucose 86 70 - 99 mg/dL   Troponin T, High Sensitivity     Status: Abnormal   Result Value Ref Range    Troponin T, High  Sensitivity 46 (H) <=14 ng/L   [facility] Draw *Canceled*     Status: None ()    Narrative    The following orders were created for panel order [facility] Draw.  Procedure                               Abnormality         Status                     ---------                               -----------         ------                       Please view results for these tests on the individual orders.   [facility] Draw *Canceled*     Status: None ()    Narrative    The following orders were created for panel order [facility].  Procedure                               Abnormality         Status                     ---------                               -----------         ------                     Extra Blue Top Tube[[id number]]                                                         Extra Red Top Tube[[id number]]                                                            Please view results for these tests on the individual orders.   CBC with platelets and differential     Status: Abnormal   Result Value Ref Range    WBC Count 7.5 4.0 - 11.0 10e3/uL    RBC Count 5.23 (H) 3.80 - 5.20 10e6/uL    Hemoglobin 9.7 (L) 11.7 - 15.7 g/dL    Hematocrit 34.4 (L) 35.0 - 47.0 %    MCV 66 (L) 78 - 100 fL    MCH 18.5 (L) 26.5 - 33.0 pg    MCHC 28.2 (L) 31.5 - 36.5 g/dL    RDW 20.2 (H) 10.0 - 15.0 %    Platelet Count 262 150 - 450 10e3/uL    % Neutrophils 82 %    % Lymphocytes 12 %    % Monocytes 4 %    % Eosinophils 1 %    % Basophils 1 %    % Immature Granulocytes 0 %    NRBCs per 100 WBC 0 <1 /100    Absolute Neutrophils 6.2 1.6 - 8.3 10e3/uL    Absolute Lymphocytes 0.9 0.8 - 5.3 10e3/uL    Absolute Monocytes 0.3 0.0 - 1.3 10e3/uL    Absolute Eosinophils 0.1 0.0 - 0.7 10e3/uL    Absolute Basophils 0.1 0.0 - 0.2 10e3/uL    Absolute Immature Granulocytes 0.0 <=0.4 10e3/uL    Absolute NRBCs 0.0 10e3/uL   TSH with free T4 reflex     Status: Normal   Result Value Ref Range    TSH 1.25 0.30 - 4.20 uIU/mL   HCG qualitative urine     Status: Normal    Result Value Ref Range    hCG Urine Qualitative Negative Negative   UA with Microscopic reflex to Culture     Status: Abnormal    Specimen: Urine, Clean Catch   Result Value Ref Range    Color Urine Colorless Colorless, Straw, Light Yellow, Yellow    Appearance Urine Clear Clear    Glucose Urine Negative Negative mg/dL    Bilirubin Urine Negative Negative    Ketones Urine 5 (A) Negative mg/dL    Specific Gravity Urine 1.002 (L) 1.003 - 1.035    Blood Urine Small (A) Negative    pH Urine 6.0 5.0 - 7.0    Protein Albumin Urine Negative Negative mg/dL    Urobilinogen Urine Normal Normal, 2.0 mg/dL    Nitrite Urine Negative Negative    Leukocyte Esterase Urine Negative Negative    Mucus Urine Present (A) None Seen /LPF    RBC Urine 1 <=2 /HPF    WBC Urine 2 <=5 /HPF    Squamous Epithelials Urine <1 <=1 /HPF    Narrative    Urine Culture not indicated   Troponin T, High Sensitivity     Status: Abnormal   Result Value Ref Range    Troponin T, High Sensitivity 58 (H) <=14 ng/L   CBC with Platelets & Differential     Status: Abnormal    Narrative    The following orders were created for panel order CBC with Platelets & Differential.  Procedure                               Abnormality         Status                     ---------                               -----------         ------                     CBC with platelets and d...[[id number]]  Abnormal            Final result                 Please view results for these tests on the individual orders.       Assessments & Plan (with Medical Decision Making)   Assessment Summary and Clinical Impression: 33-year-old female who presented with a brief episode of rapid pulse and palpitations just prior to arrival.  She has a history of anxiety and felt her symptoms were different from anxiety.  On arrival pulse was 102 blood pressure was normal she was afebrile.  She noted that she just recovered from the cold.  Family history of thyroid disorder but no personal history of  heart disease syncope with sudden cardiac death.  She played sports in high school and did not have any exertional dyspnea or chest pain.  She noted that her pulse was 210 and her iWatch rhythm was not captured.  On arrival she is in sinus tachycardia but in no acute distress.  Normal cardiac and lung exam we discussed her transient arrhythmia and that it is unclear what the etiology of her arrhythmia was although is not clear if it was SVT or atrial fibrillation or other.  After period of care her work-up revealed positive troponin with positive delta although patient was asymptomatic during her ED course.  After discussion with cardiology plan is to trend troponin consider outpatient follow-up with plateau versus admission for observation on telemetry and echocardiogram in the morning.    At shift end patient was awaiting follow-up troponin (#3) for trend with plan to likely discharge with outpatient follow-up versus admission for observation on telemetry and consideration of echocardiogram in the morning.    ED course and plan:  Reviewed the medical record.  EKG on arrival sinus tachycardia with T wave depression in lead III normal QTc with no other acute findings and no old viewable EKG for comparison discussed possible causes for her rapid pulse although the exact rhythm type is not known..  She remained in sinus rhythm during her ED course on telemetry.  With family history of thyroid disorder work-up was initiated which revealed positive troponin (obtained) on arrival.  I suspect demand ischemia due to arrhythmia with pulse of 210 reported at home (query SVT).  Work-up revealed normal TSH. Normal urinalysis. Negative pregnancy test. Hgb-9.7, (Hgb was 7.4, Ferritin <2.0 on 8/19/22).   Follow-up troponin 2 hours from arrival was 58 with a positive delta.  I reviewed patient's presentation with cardiology to review my plan of care and clinical suspicion.    Spoke with Dr. Gallegos- cardiology at 7.05pm at   [facility] to review my plan and patient's history and ED course of care. Dr Gallegos supported plan to trend troponin and if patient remains asymptomatic and troponin plateaus patient may consider will be admitted for observation and echocardiogram in the morning which is available at [facility] or discharge with outpatient follow-up.  If troponin trends upwards patient will require further discussion with cardiology for intervention and care.    At shift end at 7.15pm patient signed out to Dr. FELIPE Owens awaiting follow-up troponin (#3). Final disposition will depend on troponin trend. Anticipate discharge. If upward trend may require reconsultation with cardiology versus admission for echocardiogram and for further care.     Reviewed work-up and plan of care with the patient and her mother Henny by phone.  We discussed her history of anemia and prior need for iron transfusion with history of heavy vaginal bleeding.  I offered iron supplementation for home if she is discharged. Iron studies today were ordered and pending at shift end.    Disclaimer: This note consists of symbols derived from keyboarding, dictation and/or voice recognition software. As a result, there may be errors in the script that have gone undetected. Please consider this when interpreting information found in this chart.   I have reviewed the nursing notes.    I have reviewed the findings, diagnosis, plan and need for follow up with the patient.           Medical Decision Making  The patient's presentation was of moderate complexity (an acute illness with systemic symptoms).    The patient's evaluation involved:  ordering and/or review of 1 test(s) in this encounter (diagnostic labs)    The patient's management necessitated moderate risk (prescription drug management including medications given in the ED).        New Prescriptions    FERROUS GLUCONATE (FERGON) 324 (38 FE) MG TABLET    Take 1 tablet (324 mg) by mouth daily (with breakfast) for 60 days        Final diagnoses:   Palpitations   Elevated troponin   Iron deficiency anemia, unspecified iron deficiency anemia type       10/29/2023   [facility] EMERGENCY DEPT       Jorge Schafer MD  10/29/23 1917       Jorge Schafer MD  10/29/23 1923

## 2023-10-29 NOTE — DISCHARGE INSTRUCTIONS
1) Your evaluation today did not reveal the rhythm that caused your pulse to be fast (with a pulse of 210) at home.  We have discussed the possibilities and the need for follow-up testing and care.  I recommend that you call your primary care provider to schedule an event or Holter/ Event monitor so we can monitor for an arrhythmia such as SVT or other.  During your visit your troponin was  elevated which could be due to demand ischemia as discussed from a rapid pulse.  You arrived without any chest pain or shortness of breath and no other active symptoms.  While you were observed your pulse was not captured above 105 and was in normal range during your visit.  You started on iron supplementation due to concern for iron deficiency anemia based on hemoglobin today and previous hemoglobin-2 months prior and report of heavy vaginal bleeding with menstrual cycles    2) You appear stable for discharge to home at this time.  With plan for urgent follow-up with your primary care provider.  If you develop chest pain have any fainting episodes or have recurrence of symptoms including rapid pulse I recommend you try to capture the rhythm on your iWatch if you are able.

## 2023-10-29 NOTE — ED TRIAGE NOTES
States this afternoon she could feel her heart racing. Lasted 30 to 45 minutes. Had some chest pain with that. Chest pain has subsided now. Pt has anxiety but this felt different. Had some nausea and got light headed

## 2023-10-29 NOTE — ED NOTES
"Patient reports heart racing at home \"I could feel it in my stomach and started feeling weak.  Patient reports history of panic attacks, but reports \"heart starts racing out of nowhere, and then I start feeling very anxious.\"  Episode today lasted about 30-45 minutes, checked fit bit and heart rate was 210 bpm, checked significant others smart watch, which also read  over 200 bpm. No heart or rhythm history, no major medical history.  Patient was doing laundry when this episode started.  "

## 2023-10-30 NOTE — ED PROVIDER NOTES
"     Emergency Department Patient Sign-out       Brief HPI:  This is a 33 year old female signed out to me by Dr. Marie.  See initial ED Provider note for details of the presentation.  Patient is to have a third troponin level drawn and per Dr. Maddox if this is leveled or is going down patient can be discharged with close follow-up with her primary care physician for set up of Zio patch.  Troponin decreased to 42.  On my examination patient is not having any chest pain.  Her heart rate is below 100 and she feels comfortable going home.  If she has any chest pain shortness of breath palpitations or other symptoms she should immediately return for further evaluation and care.  She feels comfortable with this plan.          Exam:   Patient Vitals for the past 24 hrs:   BP Temp Temp src Pulse Resp SpO2 Height Weight   10/29/23 1759 120/81 -- -- 98 15 100 % -- --   10/29/23 1744 123/71 -- -- 102 (!) 6 99 % -- --   10/29/23 1729 123/88 -- -- 100 11 98 % -- --   10/29/23 1714 121/79 -- -- 100 27 100 % -- --   10/29/23 1700 122/75 -- -- 105 14 100 % -- --   10/29/23 1644 121/75 -- -- 101 16 100 % -- --   10/29/23 1630 122/81 -- -- 99 20 -- -- --   10/29/23 1600 -- -- -- 112 15 100 % -- --   10/29/23 1538 115/79 -- -- -- -- -- -- --   10/29/23 1520 136/76 98.3  F (36.8  C) Tympanic 102 16 100 % 1.676 m (5' 6\") 72.6 kg (160 lb)           ED RESULTS:   Results for orders placed or performed during the hospital encounter of 10/29/23 (from the past 24 hour(s))   [place] Draw *Canceled*     Status: None ()    Collection Time: 10/29/23  4:01 PM    Narrative    The following orders were created for panel order [place] Draw.  Procedure                               Abnormality         Status                     ---------                               -----------         ------                       Please view results for these tests on the individual orders.   CBC with Platelets & Differential     Status: Abnormal    " Collection Time: 10/29/23  4:03 PM    Narrative    The following orders were created for panel order CBC with Platelets & Differential.  Procedure                               Abnormality         Status                     ---------                               -----------         ------                     CBC with platelets and d...[[id number]]  Abnormal            Final result                 Please view results for these tests on the individual orders.   Basic metabolic panel     Status: Abnormal    Collection Time: 10/29/23  4:03 PM   Result Value Ref Range    Sodium 141 135 - 145 mmol/L    Potassium 3.8 3.4 - 5.3 mmol/L    Chloride 106 98 - 107 mmol/L    Carbon Dioxide (CO2) 18 (L) 22 - 29 mmol/L    Anion Gap 17 (H) 7 - 15 mmol/L    Urea Nitrogen 10.1 6.0 - 20.0 mg/dL    Creatinine 0.79 0.51 - 0.95 mg/dL    GFR Estimate >90 >60 mL/min/1.73m2    Calcium 9.7 8.6 - 10.0 mg/dL    Glucose 86 70 - 99 mg/dL   Troponin T, High Sensitivity     Status: Abnormal    Collection Time: 10/29/23  4:03 PM   Result Value Ref Range    Troponin T, High Sensitivity 46 (H) <=14 ng/L   [place] Draw *Canceled*     Status: None ()    Collection Time: 10/29/23  4:03 PM    Narrative    The following orders were created for panel order [place] Draw.  Procedure                               Abnormality         Status                     ---------                               -----------         ------                     Extra Blue Top Tube[[id number]]                                                         Extra Red Top Tube[[id number]]                                                            Please view results for these tests on the individual orders.   CBC with platelets and differential     Status: Abnormal    Collection Time: 10/29/23  4:03 PM   Result Value Ref Range    WBC Count 7.5 4.0 - 11.0 10e3/uL    RBC Count 5.23 (H) 3.80 - 5.20 10e6/uL    Hemoglobin 9.7 (L) 11.7 - 15.7 g/dL    Hematocrit 34.4 (L) 35.0 - 47.0 %    MCV 66  (L) 78 - 100 fL    MCH 18.5 (L) 26.5 - 33.0 pg    MCHC 28.2 (L) 31.5 - 36.5 g/dL    RDW 20.2 (H) 10.0 - 15.0 %    Platelet Count 262 150 - 450 10e3/uL    % Neutrophils 82 %    % Lymphocytes 12 %    % Monocytes 4 %    % Eosinophils 1 %    % Basophils 1 %    % Immature Granulocytes 0 %    NRBCs per 100 WBC 0 <1 /100    Absolute Neutrophils 6.2 1.6 - 8.3 10e3/uL    Absolute Lymphocytes 0.9 0.8 - 5.3 10e3/uL    Absolute Monocytes 0.3 0.0 - 1.3 10e3/uL    Absolute Eosinophils 0.1 0.0 - 0.7 10e3/uL    Absolute Basophils 0.1 0.0 - 0.2 10e3/uL    Absolute Immature Granulocytes 0.0 <=0.4 10e3/uL    Absolute NRBCs 0.0 10e3/uL   TSH with free T4 reflex     Status: Normal    Collection Time: 10/29/23  4:03 PM   Result Value Ref Range    TSH 1.25 0.30 - 4.20 uIU/mL   HCG qualitative urine     Status: Normal    Collection Time: 10/29/23  4:27 PM   Result Value Ref Range    hCG Urine Qualitative Negative Negative   UA with Microscopic reflex to Culture     Status: Abnormal    Collection Time: 10/29/23  4:27 PM    Specimen: Urine, Clean Catch   Result Value Ref Range    Color Urine Colorless Colorless, Straw, Light Yellow, Yellow    Appearance Urine Clear Clear    Glucose Urine Negative Negative mg/dL    Bilirubin Urine Negative Negative    Ketones Urine 5 (A) Negative mg/dL    Specific Gravity Urine 1.002 (L) 1.003 - 1.035    Blood Urine Small (A) Negative    pH Urine 6.0 5.0 - 7.0    Protein Albumin Urine Negative Negative mg/dL    Urobilinogen Urine Normal Normal, 2.0 mg/dL    Nitrite Urine Negative Negative    Leukocyte Esterase Urine Negative Negative    Mucus Urine Present (A) None Seen /LPF    RBC Urine 1 <=2 /HPF    WBC Urine 2 <=5 /HPF    Squamous Epithelials Urine <1 <=1 /HPF    Narrative    Urine Culture not indicated   Troponin T, High Sensitivity     Status: Abnormal    Collection Time: 10/29/23  6:14 PM   Result Value Ref Range    Troponin T, High Sensitivity 58 (H) <=14 ng/L   Iron and iron binding capacity      Status: Abnormal    Collection Time: 10/29/23  6:14 PM   Result Value Ref Range    Iron 16 (L) 37 - 145 ug/dL    Iron Binding Capacity 456 (H) 240 - 430 ug/dL    Iron Sat Index 4 (L) 15 - 46 %       ED MEDICATIONS:   Medications - No data to display      Impression:    ICD-10-CM    1. Palpitations  R00.2       2. Elevated troponin  R79.89       3. Iron deficiency anemia, unspecified iron deficiency anemia type  D50.9           Plan:    Discharge to home      MD Graciela Ceron David Charles, MD  10/30/23 2967

## 2023-11-05 ENCOUNTER — HEALTH MAINTENANCE LETTER (OUTPATIENT)
Age: 33
End: 2023-11-05

## 2023-11-15 ENCOUNTER — VIRTUAL VISIT (OUTPATIENT)
Dept: FAMILY MEDICINE | Facility: CLINIC | Age: 33
End: 2023-11-15
Payer: COMMERCIAL

## 2023-11-15 DIAGNOSIS — R79.89 ELEVATED TROPONIN: ICD-10-CM

## 2023-11-15 DIAGNOSIS — R00.2 PALPITATIONS: Primary | ICD-10-CM

## 2023-11-15 DIAGNOSIS — D50.9 MICROCYTIC ANEMIA: ICD-10-CM

## 2023-11-15 PROCEDURE — 99204 OFFICE O/P NEW MOD 45 MIN: CPT | Mod: 95 | Performed by: FAMILY MEDICINE

## 2023-11-15 RX ORDER — FERROUS GLUCONATE 324(38)MG
324 TABLET ORAL
Qty: 30 TABLET | Refills: 1 | Status: SHIPPED | OUTPATIENT
Start: 2023-11-15

## 2023-11-15 ASSESSMENT — ANXIETY QUESTIONNAIRES
2. NOT BEING ABLE TO STOP OR CONTROL WORRYING: MORE THAN HALF THE DAYS
6. BECOMING EASILY ANNOYED OR IRRITABLE: MORE THAN HALF THE DAYS
GAD7 TOTAL SCORE: 12
5. BEING SO RESTLESS THAT IT IS HARD TO SIT STILL: NOT AT ALL
7. FEELING AFRAID AS IF SOMETHING AWFUL MIGHT HAPPEN: MORE THAN HALF THE DAYS
3. WORRYING TOO MUCH ABOUT DIFFERENT THINGS: MORE THAN HALF THE DAYS
1. FEELING NERVOUS, ANXIOUS, OR ON EDGE: MORE THAN HALF THE DAYS
GAD7 TOTAL SCORE: 12
IF YOU CHECKED OFF ANY PROBLEMS ON THIS QUESTIONNAIRE, HOW DIFFICULT HAVE THESE PROBLEMS MADE IT FOR YOU TO DO YOUR WORK, TAKE CARE OF THINGS AT HOME, OR GET ALONG WITH OTHER PEOPLE: SOMEWHAT DIFFICULT

## 2023-11-15 ASSESSMENT — PATIENT HEALTH QUESTIONNAIRE - PHQ9
5. POOR APPETITE OR OVEREATING: MORE THAN HALF THE DAYS
SUM OF ALL RESPONSES TO PHQ QUESTIONS 1-9: 3

## 2023-11-15 NOTE — PROGRESS NOTES
Betsey is a 33 year old who is being evaluated via a billable video visit.      How would you like to obtain your AVS? MyChart  If the video visit is dropped, the invitation should be resent by: Text to cell phone: [phone]  Will anyone else be joining your video visit? No          Assessment & Plan     Palpitations, etiology unclear  Initial workup completed in the ER- see Epic for details.   Will proceed with an Echo and Zio Patch Placement  - Echocardiogram Complete  - Adult Leadless EKG Monitor 8 to 14 Days    Elevated troponin, trending down  - Troponin T, High Sensitivity    Microcytic anemia  Recommended iron rich foods and iron supplementation.  - ferrous gluconate (FERGON) 324 (38 Fe) MG tablet  Dispense: 30 tablet; Refill: 1       MED REC REQUIREDPost Medication Reconciliation Status:  Discharge medications reconciled, continue medications without change      Return in about 1 month (around 12/15/2023) for a Physical Exam at your earliest convenience..      Kiya Almonte MD  [facility] SAPNA Leggett is a 33 year old, presenting for the following health issues:  Establish Care and Derm Problem      11/15/2023    12:35 PM   Additional Questions   Roomed by MP   Accompanied by NA         11/15/2023    12:35 PM   Patient Reported Additional Medications   Patient reports taking the following new medications None per patient     Patient would also like to est care    HPI     ED/UC Followup:    Facility:  [facility]  Date of visit: 10/29/23  Reason for visit:palpatations  Current Status: discharged-doing ok    ER records reviewed.           33-year-old female who presented with a brief episode of rapid pulse and palpitations just prior to arrival.  She has a history of anxiety and felt her symptoms were different from anxiety.  On arrival pulse was 102 blood pressure was normal she was afebrile.  She noted that she just recovered from the cold.  Family history of thyroid  disorder but no personal history of heart disease syncope with sudden cardiac death.  She played sports in high school and did not have any exertional dyspnea or chest pain.  She noted that her pulse was 210 and her iWatch rhythm was not captured.  On arrival she is in sinus tachycardia but in no acute distress.  Normal cardiac and lung exam we discussed her transient arrhythmia and that it is unclear what the etiology of her arrhythmia was although is not clear if it was SVT or atrial fibrillation or other.  After period of care her work-up revealed positive troponin with positive delta although patient was asymptomatic during her ED course.  After discussion with cardiology plan is to trend troponin consider outpatient follow-up.     ER work up revealed anemia and elevated Troponin levels.     Noted elevated Troponin levels.   Component      Latest Ref Rng 10/29/2023  4:03 PM   Troponin T, High Sensitivity      <=14 ng/L 46 (H)       Component      Latest Ref Rng 10/29/2023  6:14 PM   Troponin T, High Sensitivity      <=14 ng/L 58 (H)       Component      Latest Ref Rng 10/29/2023  9:16 PM   Troponin T, High Sensitivity      <=14 ng/L 42 (H)         Patient admits that she is under stress and has marital issues they are working through  History of anxiety. States that this was quit different from prior panic attacks that she's had in the past.   No recurrent palpitations, chest pain or shortness of breath. No fever or chills.       Review of Systems   Constitutional, HEENT, cardiovascular, pulmonary, gi and gu systems are negative, except as otherwise noted.      Objective           Vitals:  No vitals were obtained today due to virtual visit.    Physical Exam   GENERAL: Healthy, alert and no distress  EYES: Eyes grossly normal to inspection.  No discharge or erythema, or obvious scleral/conjunctival abnormalities.  RESP: No audible wheeze, cough, or visible cyanosis.  No visible retractions or increased work of  breathing.    SKIN: Visible skin clear. No significant rash, abnormal pigmentation or lesions.  NEURO: Cranial nerves grossly intact.  Mentation and speech appropriate for age.  PSYCH: Mentation appears normal, affect normal/bright, judgement and insight intact, normal speech and appearance well-groomed.    DATA  Recent labs reviewed.            Video-Visit Details    Type of service:  Video Visit   Video Start Time:  3:00 pm   Video End Time: 3:30 pm    Originating Location (pt. Location): Home  Distant Location (provider location):  On-site  Platform used for Video Visit: Jorge

## 2023-11-21 ENCOUNTER — LAB (OUTPATIENT)
Dept: LAB | Facility: CLINIC | Age: 33
End: 2023-11-21
Payer: COMMERCIAL

## 2023-11-21 DIAGNOSIS — R79.89 ELEVATED TROPONIN: ICD-10-CM

## 2023-11-21 PROCEDURE — 36415 COLL VENOUS BLD VENIPUNCTURE: CPT

## 2023-11-21 PROCEDURE — 84484 ASSAY OF TROPONIN QUANT: CPT

## 2023-11-22 LAB — TROPONIN T SERPL HS-MCNC: <6 NG/L

## 2023-11-24 ENCOUNTER — ANCILLARY PROCEDURE (OUTPATIENT)
Dept: CARDIOLOGY | Facility: CLINIC | Age: 33
End: 2023-11-24
Attending: FAMILY MEDICINE
Payer: COMMERCIAL

## 2023-11-24 DIAGNOSIS — R00.2 PALPITATIONS: ICD-10-CM

## 2023-11-24 LAB — LVEF ECHO: NORMAL

## 2023-11-24 PROCEDURE — 93306 TTE W/DOPPLER COMPLETE: CPT | Performed by: INTERNAL MEDICINE

## 2023-11-28 ENCOUNTER — ANCILLARY PROCEDURE (OUTPATIENT)
Dept: CARDIOLOGY | Facility: CLINIC | Age: 33
End: 2023-11-28
Attending: FAMILY MEDICINE
Payer: COMMERCIAL

## 2023-11-28 DIAGNOSIS — R00.2 PALPITATIONS: ICD-10-CM

## 2023-11-28 PROCEDURE — 93248 EXT ECG>7D<15D REV&INTERPJ: CPT | Performed by: INTERNAL MEDICINE

## 2023-11-28 PROCEDURE — 93246 EXT ECG>7D<15D RECORDING: CPT | Performed by: INTERNAL MEDICINE

## 2023-11-28 NOTE — PATIENT INSTRUCTIONS
Patient has been prescribed a ZioPatch holter for 14 days.  Patient was instructed regarding the indication, function, care and prompt return of the ZioPatch holter monitor. The monitor, with S/N [id number],  was placed on the patient with instructions regarding care of the skin, electrodes, and monitor, as well as documentation in the patient diary. Patient demonstrated understanding of this information and agreed to call [employer] with further questions or concerns.

## 2023-12-01 ENCOUNTER — PATIENT OUTREACH (OUTPATIENT)
Dept: FAMILY MEDICINE | Facility: CLINIC | Age: 33
End: 2023-12-01
Payer: COMMERCIAL

## 2023-12-01 NOTE — TELEPHONE ENCOUNTER
Patient Quality Outreach    Type of outreach:    Chart review performed, no outreach needed.  Patient has physical appointment scheduled 01/17/2024, HM/Quality measures will be addressed at that appt.      Cathryn Hernandez MA

## 2024-12-22 ENCOUNTER — HEALTH MAINTENANCE LETTER (OUTPATIENT)
Age: 34
End: 2024-12-22